# Patient Record
Sex: FEMALE | Race: WHITE | NOT HISPANIC OR LATINO | Employment: UNEMPLOYED | URBAN - METROPOLITAN AREA
[De-identification: names, ages, dates, MRNs, and addresses within clinical notes are randomized per-mention and may not be internally consistent; named-entity substitution may affect disease eponyms.]

---

## 2019-09-06 ENCOUNTER — APPOINTMENT (EMERGENCY)
Dept: RADIOLOGY | Facility: HOSPITAL | Age: 39
End: 2019-09-06
Payer: COMMERCIAL

## 2019-09-06 ENCOUNTER — HOSPITAL ENCOUNTER (EMERGENCY)
Facility: HOSPITAL | Age: 39
Discharge: HOME | End: 2019-09-06
Attending: EMERGENCY MEDICINE | Admitting: EMERGENCY MEDICINE
Payer: COMMERCIAL

## 2019-09-06 VITALS
SYSTOLIC BLOOD PRESSURE: 119 MMHG | RESPIRATION RATE: 17 BRPM | HEART RATE: 68 BPM | OXYGEN SATURATION: 100 % | DIASTOLIC BLOOD PRESSURE: 54 MMHG | TEMPERATURE: 98.8 F

## 2019-09-06 DIAGNOSIS — S22.39XA RIB FRACTURE: Primary | ICD-10-CM

## 2019-09-06 DIAGNOSIS — S92.909A FOOT FRACTURE: ICD-10-CM

## 2019-09-06 LAB
EXT PREG TEST URINE: NEGATIVE
EXT. CONTROL ED NAV: NORMAL

## 2019-09-06 PROCEDURE — 73610 X-RAY EXAM OF ANKLE: CPT

## 2019-09-06 PROCEDURE — 71101 X-RAY EXAM UNILAT RIBS/CHEST: CPT

## 2019-09-06 PROCEDURE — 81025 URINE PREGNANCY TEST: CPT | Performed by: PHYSICIAN ASSISTANT

## 2019-09-06 PROCEDURE — 96372 THER/PROPH/DIAG INJ SC/IM: CPT

## 2019-09-06 PROCEDURE — 73630 X-RAY EXAM OF FOOT: CPT

## 2019-09-06 PROCEDURE — 99284 EMERGENCY DEPT VISIT MOD MDM: CPT

## 2019-09-06 RX ORDER — LIDOCAINE 50 MG/G
1 PATCH TOPICAL DAILY
Qty: 30 PATCH | Refills: 0 | Status: SHIPPED | OUTPATIENT
Start: 2019-09-06 | End: 2019-09-23

## 2019-09-06 RX ORDER — LIDOCAINE 50 MG/G
1 PATCH TOPICAL ONCE
Status: DISCONTINUED | OUTPATIENT
Start: 2019-09-06 | End: 2019-09-06 | Stop reason: HOSPADM

## 2019-09-06 RX ORDER — KETOROLAC TROMETHAMINE 30 MG/ML
30 INJECTION, SOLUTION INTRAMUSCULAR; INTRAVENOUS ONCE
Status: COMPLETED | OUTPATIENT
Start: 2019-09-06 | End: 2019-09-06

## 2019-09-06 RX ADMIN — KETOROLAC TROMETHAMINE 30 MG: 30 INJECTION INTRAMUSCULAR; INTRAVENOUS at 16:30

## 2019-09-06 RX ADMIN — LIDOCAINE 1 PATCH: 50 PATCH TOPICAL at 16:10

## 2019-09-06 NOTE — ED PROVIDER NOTES
History  Chief Complaint   Patient presents with   Nikki Funes     pt fell Sunday now c/o r sided rib pain with r foot pain     44year old female hx anxiety presents with R rib pain x6 days  She had a mechanical trip and fall down some steps at home onto cement and landed on her R foot and R ribs  She states she should have come in for evaluation earlier however was unable to due to car troubles  She has been taking ibuprofen with minimal relief  She states for the past few days her foot has been getting more painful and she has worsening pain with ambulation  No numbness or tingling  Her foot is swollen  She denies hitting her head or losing consciousness  No nausea or vomiting  No abdominal pain  No chest pain, difficulty breathing, shortness of breath  LMP 3 weeks ago  [place] checked she takes klonopin 1mg QID, given 112 tabs filled 8/29/19 and percocet 7 5-325 TID given 84 tabs which was filled 8/31/19 with a 28 day supply  None       No past medical history on file  No past surgical history on file  No family history on file  I have reviewed and agree with the history as documented  Social History     Tobacco Use    Smoking status: Current Every Day Smoker   Substance Use Topics    Alcohol use: Not Currently     Frequency: Never    Drug use: Never        Review of Systems   Constitutional: Negative for chills and fever  HENT: Negative for sneezing and sore throat  Respiratory: Negative for cough and shortness of breath  Cardiovascular: Negative for chest pain, palpitations and leg swelling  Gastrointestinal: Negative for abdominal pain, constipation, diarrhea, nausea and vomiting  Musculoskeletal: Positive for arthralgias, gait problem and myalgias  Negative for back pain and joint swelling  Skin: Negative for color change, pallor, rash and wound  Neurological: Negative for dizziness, syncope, weakness, light-headedness, numbness and headaches     All other systems reviewed and are negative  Physical Exam  Physical Exam   Constitutional: She appears well-developed and well-nourished  No distress  HENT:   Head: Normocephalic and atraumatic  Nose: Nose normal    Eyes: EOM are normal    Neck: Normal range of motion  Cardiovascular: Normal rate, regular rhythm, normal heart sounds and intact distal pulses  Exam reveals no gallop and no friction rub  No murmur heard  Pulmonary/Chest: Effort normal and breath sounds normal  No stridor  No respiratory distress  She has no wheezes  She has no rales  She exhibits tenderness and bony tenderness  Sp02 is 100% indicating adequate oxygenation on room air       Musculoskeletal: She exhibits tenderness  She exhibits no edema or deformity  Feet:    No midline or paravertebral tenderness at cervical, thoracic, or lumbar spine  No step offs  Sensation intact  Skin: Skin is warm and dry  Capillary refill takes less than 2 seconds  No rash noted  She is not diaphoretic  No erythema  No pallor  Nursing note and vitals reviewed        Vital Signs  ED Triage Vitals   Temperature Pulse Respirations Blood Pressure SpO2   09/06/19 1543 09/06/19 1543 09/06/19 1543 09/06/19 1543 09/06/19 1543   98 8 °F (37 1 °C) 68 17 119/54 100 %      Temp Source Heart Rate Source Patient Position - Orthostatic VS BP Location FiO2 (%)   09/06/19 1543 09/06/19 1543 09/06/19 1543 09/06/19 1543 --   Tympanic Monitor Sitting Right arm       Pain Score       09/06/19 1630       Worst Possible Pain           Vitals:    09/06/19 1543   BP: 119/54   Pulse: 68   Patient Position - Orthostatic VS: Sitting         Visual Acuity      ED Medications  Medications   lidocaine (LIDODERM) 5 % patch 1 patch (1 patch Topical Medication Applied 9/6/19 1610)   ketorolac (TORADOL) injection 30 mg (30 mg Intramuscular Given 9/6/19 1630)       Diagnostic Studies  Results Reviewed     Procedure Component Value Units Date/Time    POCT pregnancy, urine [[id number]]  (Normal) Resulted:  09/06/19 1627    Lab Status:  Final result Updated:  09/06/19 1627     EXT PREG TEST UR (Ref: Negative) negative     Control valid                 XR ribs with pa chest min 3 views RIGHT   Final Result by Presley Kearney MD (09/06 5382)      Slightly displaced anterior right seventh rib fracture  No active disease in the chest       The study was marked in EPIC for immediate notification  Workstation performed: [id number]         XR foot 3+ views RIGHT    (Results Pending)   XR ankle 3+ views RIGHT    (Results Pending)              Procedures  Splint application  Date/Time: 9/6/2019 5:21 PM  Performed by: Lida Gutierres PA-C  Authorized by: Lida Gutierres PA-C     Patient location:  Bedside  Consent:     Consent obtained:  Verbal    Consent given by:  Patient    Risks discussed:  Discoloration, numbness, pain and swelling    Alternatives discussed:  No treatment, delayed treatment, alternative treatment, observation and referral  Universal protocol:     Procedure explained and questions answered to patient or proxy's satisfaction: yes      Relevant documents present and verified: yes      Test results available and properly labeled: yes      Radiology Images displayed and confirmed    If images not available, report reviewed: yes      Required blood products, implants, devices, and special equipment available: yes      Site/side marked: yes      Immediately prior to procedure a time out was called: yes      Patient identity confirmed:  Verbally with patient  Indication:     Indications: fracture    Pre-procedure details:     Sensation:  Normal  Procedure details:     Laterality:  Right    Location:  Foot    Foot:  R foot    Splint type:  Short leg    Supplies:  Ortho-Glass, cotton padding and elastic bandage  Post-procedure details:     Pain:  Improved    Sensation:  Normal    Neurovascular Exam: skin pink, capillary refill <2 sec, normal pulses and skin intact, warm, and dry      Patient tolerance of procedure: Tolerated well, no immediate complications  Comments:      Good neurovascular exam before and after splint placement           ED Course                               MDM  Number of Diagnoses or Management Options  Foot fracture:   Rib fracture:   Diagnosis management comments: Placed patient in splint with good neurovascular exam before and after placement  Able to ambulate with use of surgical shoe as well  Continue percocet as prescribed for pain, will give lidoderm patches as well  Given incentive spirometry   Follow up with orthopedics  Gave patient proper education regarding diagnosis  Answered all questions  Return to ED for any worsening of symptoms otherwise follow up with primary care physician for re-evaluation  Discussed plan with patient who verbalized understanding and agreed to plan  Amount and/or Complexity of Data Reviewed  Tests in the radiology section of CPT®: reviewed and ordered  Discussion of test results with the performing providers: yes  Review and summarize past medical records: yes  Discuss the patient with other providers: yes  Independent visualization of images, tracings, or specimens: yes        Disposition  Final diagnoses:   Rib fracture   Foot fracture     Time reflects when diagnosis was documented in both MDM as applicable and the Disposition within this note     Time User Action Codes Description Comment    9/6/2019  6:11 PM Evelia Mccormick Add [S22 39XA] Rib fracture     9/6/2019  6:11 PM Alex, [address] fracture       ED Disposition     ED Disposition Condition Date/Time Comment    Discharge Stable Fri Sep 6, 2019  6:10 PM Kristen Miner discharge to home/self care              Follow-up Information     Follow up With Specialties Details Why Contact Info Additional Information    Tawana Bey MD Orthopedic Surgery Call in 2 days to schedule follow up appointment rib/foot fractures [address] [id number]  [facility] Emergency Department Emergency Medicine Go to  As needed [address] [facility], [place], [place], [address]          Patient's Medications   Discharge Prescriptions    LIDOCAINE (LIDODERM) 5 %    Apply 1 patch topically daily Remove & Discard patch within 12 hours or as directed by MD       Start Date: 9/6/2019  End Date: --       Order Dose: 1 patch       Quantity: 30 patch    Refills: 0     No discharge procedures on file      ED Provider  Electronically Signed by           Macie Falcon PA-C  09/06/19 9182

## 2019-09-07 NOTE — ED PROVIDER NOTES
ER received a call from Radiology regarding immediate x-ray findings  Reviewed patient's chart  She was placed in a splint for 5th metatarsal fracture  She was given orthopedics follow-up  No need for immediate call at this time       Edmond Cole MD  09/07/19 [id number]

## 2019-09-23 ENCOUNTER — APPOINTMENT (OUTPATIENT)
Dept: RADIOLOGY | Facility: CLINIC | Age: 39
End: 2019-09-23
Payer: COMMERCIAL

## 2019-09-23 ENCOUNTER — OFFICE VISIT (OUTPATIENT)
Dept: OBGYN CLINIC | Facility: CLINIC | Age: 39
End: 2019-09-23
Payer: COMMERCIAL

## 2019-09-23 VITALS
SYSTOLIC BLOOD PRESSURE: 105 MMHG | HEIGHT: 71 IN | HEART RATE: 73 BPM | DIASTOLIC BLOOD PRESSURE: 67 MMHG | BODY MASS INDEX: 23.8 KG/M2 | WEIGHT: 170 LBS

## 2019-09-23 DIAGNOSIS — M79.671 PAIN IN RIGHT FOOT: Primary | ICD-10-CM

## 2019-09-23 DIAGNOSIS — M79.671 PAIN IN RIGHT FOOT: ICD-10-CM

## 2019-09-23 DIAGNOSIS — S92.351A DISPLACED FRACTURE OF FIFTH METATARSAL BONE, RIGHT FOOT, INITIAL ENCOUNTER FOR CLOSED FRACTURE: ICD-10-CM

## 2019-09-23 DIAGNOSIS — S22.31XA CLOSED FRACTURE OF ONE RIB OF RIGHT SIDE, INITIAL ENCOUNTER: ICD-10-CM

## 2019-09-23 PROCEDURE — 99203 OFFICE O/P NEW LOW 30 MIN: CPT | Performed by: ORTHOPAEDIC SURGERY

## 2019-09-23 PROCEDURE — 28470 CLTX METATARSAL FX WO MNP EA: CPT | Performed by: ORTHOPAEDIC SURGERY

## 2019-09-23 PROCEDURE — 73630 X-RAY EXAM OF FOOT: CPT

## 2019-09-23 RX ORDER — OXYCODONE AND ACETAMINOPHEN 7.5; 325 MG/1; MG/1
1 TABLET ORAL EVERY 4 HOURS PRN
COMMUNITY

## 2019-09-23 RX ORDER — CLONAZEPAM 0.5 MG/1
0.5 TABLET, ORALLY DISINTEGRATING ORAL 3 TIMES DAILY
COMMUNITY

## 2019-09-23 RX ORDER — OXYCODONE HYDROCHLORIDE AND ACETAMINOPHEN 5; 325 MG/1; MG/1
TABLET ORAL
COMMUNITY
Start: 2016-09-13 | End: 2019-09-23 | Stop reason: ALTCHOICE

## 2019-09-23 NOTE — PROGRESS NOTES
Assessment/Plan:  1  Pain in right foot  XR foot 3+ vw right   2  Displaced fracture of fifth metatarsal bone, right foot, initial encounter for closed fracture     3  Closed fracture of one rib of right side, initial encounter         Scribe Attestation    I,:   Carter Wills Call am acting as a scribe while in the presence of the attending physician :        I,:   Vida Doherty MD personally performed the services described in this documentation    as scribed in my presence :              Kristen has a minimally displaced fracture of the base of 5th right metatarsal that will do well with conservative treatment  I do not feel that this is a surgical case  She was fitted with a Cam walker boot today she was encouraged to bathe and clean the right foot as she has not done so since the fracture  She will use the boot at all times except when sleeping  She will follow up with my physician assistant in 4 weeks  I explained to her that the rib fracture will take up to 3 months to heal and she can anticipate that will remain sore  Subjective:   Hunter Bynum is a 44 y o  female who presents to the office today for evaluation of a right foot fracture suffered 17 days ago following a fall down 9 stairs  She injured both her foot and right ribs  She is referred by her primary care physician  She was evaluated in the ED for this and diagnosed with the right foot fracture  And a right 7th rib fracture  She states her foot remains quite sore  She has remained in her postop shoe provided to her by the ED  She has not bathe the foot as she was fearful removing the shoe  She continues to have the persistent mild and dull ache about the lateral aspect of the right foot that can become more sharp and severe when bearing weight on the right lower extremity  Range of motion cause an increase in her pain and she is somewhat better with rest   She denies distal paresthesias    She states the rib fracture remains quite sore as well   Movement in the truncal cause a increase in her pain on the right sided chest   She has no difficulty breathing and has been using his spirometer as recommended by the emergency department  Review of Systems   Constitutional: Negative for chills, fever and unexpected weight change  HENT: Negative for hearing loss, nosebleeds and sore throat  Eyes: Negative for pain, redness and visual disturbance  Respiratory: Negative for cough, shortness of breath and wheezing  Cardiovascular: Negative for chest pain, palpitations and leg swelling  Gastrointestinal: Negative for abdominal pain, nausea and vomiting  Endocrine: Negative for polydipsia and polyuria  Genitourinary: Negative for dysuria and hematuria  Musculoskeletal:        See HPI   Skin: Negative for rash and wound  Neurological: Negative for dizziness, numbness and headaches  Psychiatric/Behavioral: Negative for decreased concentration and suicidal ideas  The patient is not nervous/anxious            Past Medical History:   Diagnosis Date    Arthritis     Lumbar herniated disc     Sciatica        Past Surgical History:   Procedure Laterality Date    BREAST IMPLANT         Family History   Problem Relation Age of Onset    Osteoporosis Mother     No Known Problems Sister     No Known Problems Brother     No Known Problems Maternal Aunt     No Known Problems Maternal Uncle     No Known Problems Paternal Aunt     No Known Problems Paternal Uncle     No Known Problems Maternal Grandmother     No Known Problems Maternal Grandfather     No Known Problems Paternal Grandmother     No Known Problems Paternal Grandfather        Social History     Occupational History    Not on file   Tobacco Use    Smoking status: Current Every Day Smoker    Smokeless tobacco: Never Used   Substance and Sexual Activity    Alcohol use: Not Currently     Frequency: Never    Drug use: Never    Sexual activity: Not on file         Current Outpatient Medications:     clonazePAM (KlonoPIN) 0 5 MG disintegrating tablet, Take 0 5 mg by mouth Three times a day, Disp: , Rfl:     oxyCODONE-acetaminophen (PERCOCET) 7 5-325 MG per tablet, Take 1 tablet by mouth every 4 (four) hours as needed for moderate pain, Disp: , Rfl:     Allergies   Allergen Reactions    Erythromycin      Reaction Date: [id number]; Annotation - [id number]: MAKES HER NAUSEOUS       Objective:  Vitals:    09/23/19 0912   BP: 105/67   Pulse: 73       Right Ankle Exam     Tenderness   Right ankle tenderness location: Base of the 5th metatarsal   Swelling: mild    Muscle Strength   Dorsiflexion:  4/5  Plantar flexion:  4/5  Anterior tibial:  4/5  Gastrocsoleus:  4/5  Peroneal muscle:  4/5    Other   Erythema: absent  Scars: absent  Sensation: normal  Pulse: present (2+ DP)     Comments:  Range of motion strength limited secondary to pain          Observations     Right Ankle/Foot   Negative for adhesive scar  Strength/Myotome Testing     Right Ankle/Foot   Dorsiflexion: 4  Plantar flexion: 4      Physical Exam   Constitutional: She is oriented to person, place, and time  She appears well-developed and well-nourished  HENT:   Head: Normocephalic and atraumatic  Eyes: Conjunctivae are normal  Right eye exhibits no discharge  Left eye exhibits no discharge  Neck: Normal range of motion  Neck supple  Cardiovascular: Regular rhythm and intact distal pulses  Pulmonary/Chest: Effort normal  No stridor  No respiratory distress  Neurological: She is alert and oriented to person, place, and time  Skin: Skin is warm and dry  Psychiatric: She has a normal mood and affect  Her behavior is normal    Vitals reviewed        I have personally reviewed pertinent films in PACS and my interpretation is as follows:  X-rays of the right foot demonstrate a minimally displaced fracture at the base of the 5th metatarsal

## 2019-10-06 ENCOUNTER — OFFICE VISIT (OUTPATIENT)
Dept: URGENT CARE | Facility: CLINIC | Age: 39
End: 2019-10-06
Payer: COMMERCIAL

## 2019-10-06 VITALS
TEMPERATURE: 100 F | DIASTOLIC BLOOD PRESSURE: 67 MMHG | HEIGHT: 71 IN | WEIGHT: 191 LBS | BODY MASS INDEX: 26.74 KG/M2 | HEART RATE: 87 BPM | SYSTOLIC BLOOD PRESSURE: 110 MMHG | RESPIRATION RATE: 18 BRPM | OXYGEN SATURATION: 97 %

## 2019-10-06 DIAGNOSIS — B97.89 SORE THROAT (VIRAL): ICD-10-CM

## 2019-10-06 DIAGNOSIS — J02.8 SORE THROAT (VIRAL): ICD-10-CM

## 2019-10-06 DIAGNOSIS — S61.501A OPEN WOUND OF RIGHT WRIST, INITIAL ENCOUNTER: Primary | ICD-10-CM

## 2019-10-06 DIAGNOSIS — M79.671 FOOT PAIN, BILATERAL: ICD-10-CM

## 2019-10-06 DIAGNOSIS — M79.672 FOOT PAIN, BILATERAL: ICD-10-CM

## 2019-10-06 PROCEDURE — 99203 OFFICE O/P NEW LOW 30 MIN: CPT | Performed by: PHYSICIAN ASSISTANT

## 2019-10-06 RX ORDER — SULFAMETHOXAZOLE AND TRIMETHOPRIM 800; 160 MG/1; MG/1
1 TABLET ORAL EVERY 12 HOURS SCHEDULED
Qty: 14 TABLET | Refills: 0 | Status: SHIPPED | OUTPATIENT
Start: 2019-10-06 | End: 2019-10-07 | Stop reason: SDUPTHER
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
## 2019-10-07 DIAGNOSIS — S61.501A OPEN WOUND OF RIGHT WRIST, INITIAL ENCOUNTER: ICD-10-CM

## 2019-10-07 RX ORDER — SULFAMETHOXAZOLE AND TRIMETHOPRIM 800; 160 MG/1; MG/1
1 TABLET ORAL EVERY 12 HOURS SCHEDULED
Qty: 14 TABLET | Refills: 0 | Status: SHIPPED | OUTPATIENT
Start: 2019-10-07 | End: 2019-10-14